# Patient Record
Sex: OTHER/UNKNOWN
[De-identification: names, ages, dates, MRNs, and addresses within clinical notes are randomized per-mention and may not be internally consistent; named-entity substitution may affect disease eponyms.]

---

## 2024-07-24 ENCOUNTER — APPOINTMENT (OUTPATIENT)
Dept: PLASTIC SURGERY | Facility: CLINIC | Age: 19
End: 2024-07-24
Payer: MEDICAID

## 2024-07-24 VITALS
DIASTOLIC BLOOD PRESSURE: 88 MMHG | WEIGHT: 130 LBS | OXYGEN SATURATION: 94 % | BODY MASS INDEX: 19.26 KG/M2 | HEIGHT: 69 IN | SYSTOLIC BLOOD PRESSURE: 127 MMHG | HEART RATE: 107 BPM

## 2024-07-24 DIAGNOSIS — F64.9 GENDER IDENTITY DISORDER, UNSPECIFIED: ICD-10-CM

## 2024-07-24 DIAGNOSIS — Z87.19 PERSONAL HISTORY OF OTHER DISEASES OF THE DIGESTIVE SYSTEM: ICD-10-CM

## 2024-07-24 PROBLEM — Z00.00 ENCOUNTER FOR PREVENTIVE HEALTH EXAMINATION: Status: ACTIVE | Noted: 2024-07-24

## 2024-07-24 PROCEDURE — 99203 OFFICE O/P NEW LOW 30 MIN: CPT

## 2024-07-24 RX ORDER — ESTRADIOL VALERATE 40 MG/ML
INJECTION INTRAMUSCULAR
Refills: 0 | Status: ACTIVE | COMMUNITY

## 2024-07-24 RX ORDER — OMEPRAZOLE 40 MG/1
40 CAPSULE, DELAYED RELEASE ORAL
Refills: 0 | Status: ACTIVE | COMMUNITY

## 2024-07-24 RX ORDER — SPIRONOLACTONE 100 MG/1
100 TABLET ORAL
Refills: 0 | Status: ACTIVE | COMMUNITY

## 2024-07-25 NOTE — DISCUSSION/SUMMARY
[FreeTextEntry1] : This evon patient began transitioning since December 2023. She has been on hormonal therapy consistently since December 2023. She has been in therapy and was diagnosed with Gender Dysphoria concerned about certain facial features that appear masculine and cause bullying desires facial feminization surgery followed by Transgender team. The following facial features appear masculine and will need to be modified: -Brow -Nose -Jawline -Neck -cheeks -Lips     Allergies: - NKDA   Meds: - Estradiol injection - Omeprazole   Surgical History Denies past surgical history   Denies previous botox, fillers or silicone injections.  SH: Denies marijuana use, Denies Cigarette use   ROS: General health / Constitutional: no fever, no chills, no unusual weight changes, no energy level changes, no night sweats Skin: No color or pigmentation changes, no pruritis, no rashes, no ulcers, Hair: No changes in color, texture, distribution, loss Nails: No color changes, brittleness, infection Head: No headaches, no new jaw pain Eyes: Good visual acuity, no color blindness, no corrective lenses, no photophobia, no diplopia, no blurred vision, no infection, pain, no medications, Ear: no tinnitus, no discharge, no pain, no medications Nose: no epistaxis, no rhinorrhea, no rhinitis, no pain, Mouth & Throat: no gingivitis, no gingival bleeding, no ulcers, no voice changes, no changes in oral mucosa or tongue Neck no stiffness, no pain, no lymphadenitis, no thyroid disorders, Respiratory: no cough, no dyspnea, no wheezing, no chest pain, cyanosis, no pneumonia, no asthma, Cardiovascular: no chest pain, no palpitations, no irregular rhythm, no tachycardia, no bradycardia, no heart failure, no dyspnea on exertion (IBARRA), no edema Gastrointestinal: no nausea / vomiting, no dysphagia, no reflux / GERD, no abdominal pain, no jaundice Musculoskeletal: no pain in muscles, bones, or joints; no fractures, no dislocations, no muscular weakness, no atrophy Neurological: no paresis, no paralysis, no paresthesia, no seizures, no dizziness, no syncope, no ataxia, no tremor Psychological: no childhood behavioral problems, no irritability, no sleep changes Hematological: no anemia, no bruising, no bleeding tendencies,   PHYSICAL EXAM General: WDWN, in no distress, A & O x 3 (person, place, time) HEENT Head: AT/NC (atraumatic, normocephalic), including TMJ, sensory and motor; + Prominent brow and lateral orbital rim type III Eyes: EOMI, PERRLA Ears: exterior, nl hearing, Nose: + slightly wide, bulbous nasal tip with acute nasolabial angle intranasal exam showed enlarged turbinates and deviated caudal septum Throat & mouth: gd palate elevation, nl tongue mobility, nl tonsil size; + Prominent mandibular angle with active masseter, boxy wide chin, Hypoplastic cheeks, Hypoplastic upper and lower lips. Neck: no masses, nl pulses, no prominent tracheal bulge ("Jakob's Apple"), +excess submental fat   We had a 25 minute meeting with the patient discussing diagnosis and medical management issues and outcomes.   First stage FFS: 21139: Frontal sinus anterior wall setback 68576: Orbital reconstruction bilateral 64173: Hairline lowering 13925: Browlift bilateral 68094: Supraorbital bar recontouring bilateral 48262: Tarsorrhaphy bilateral 89121: Mandibular angle contouring 63557-63: Mandibular angle osteotomy 62127: Mandibular reconstruction with autologous tissue bilateral 49162: Osseous genioplasty narrowing, shortening 68167: Mandibular reconstruction with prosthetics bilateral 44005: Rhinoplasty open 35185: Submucous resection of septum 99020: Cartilage grafting for nasal reconstruction, use of cadaveric cartilage for  grafts, columellar strut, tip graft 78736: Submental fat excision 93042: platysmaplasty 40799 x 2: Upper and lower lip augmentation 14303-73: Bilateral cheek implants       21139 (Frontal sinus setback): Previous exposure to testosterone has led this patient to have a male appearing forehead with a more bossed shaped; this is opposed to a female appearing forehead that is more flat. A frontal sinus setback procedure will reshape the anterior wall of the frontal sinus by pushing back the bone and change the contour from convex (male-shape) to flat (female-shape). This surgical change will create a more flattened feminine brow appearance.          55012 (hairline lowering) and 76821 (Browlift): Previous exposure to testosterone has led to the patient having a male M-shaped hairline as opposed to a female upside-down U-shaped hairline. Her receded hairline also creates a high, broad male appearing forehead. Her low set eyebrows on top of her orbital roof rim give her a emmanuel, male-appearing look. Both of these male traits: a high hairline and low-set brows are causing her intense feelings of dysphoria. She would benefit from bilateral browlift and hairline lowering procedures. Raising her lateral eyebrow with a bilateral browlift will create a more female appearance. She has stressed a strong desire to wear her own natural hair and does not want to always have to wear a wig to cover up her male features.          21256 (bilateral orbital reconstruction): The bone growth that occurred during testosterone exposure in the upper half of each orbital has caused this patient to have male appearing orbital features that contribute to the sense of dysphoria she feels in public. Orbital reconstruction with a reciprocating saw for an L-shaped ostectomy, on both sides will help remove the excessive bony protrusion; this will be followed by bone material grafting and resorbable plate fixation. The bilateral orbital reconstruction will help alleviate these orbital and upper facial male features.          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 Supraorbital contouring of this lateral orbital region with a pineapple abiel will correct this male feature that is causing this patient dysphoria. These procedures also allows us to do a success browlift procedure since the overhang of bone can inhibit the upper movement of the brow and the removal of this allows for an effective lift.          47387 (Tarsorrhaphy): Bilateral tarsorrhaphy or surgical closure of both eyelids, after protective lacrilube or perilube ointment is applied, is necessary for the safety of the patients globes. With the brow reshaping and browlift procedure the upper eyelid will be pulled up so the globe will be exposed and unprotected during this long, proposed procedure. The tarsorrhaphies, allows both globes to be protected so the complications of corneal abrasions may be prevented.          18652-25 (mandibular angle osteotomy) and 21209 (mandibular angle contouring): This patient has an angular, more boxy jaw which results in male appearing lower face. She also has increased lower facial width related to her mandibular angle lateral projection. Mandibular angle resection and contouring will create a more feminine triangular jaw. By having a mandibular angle reduction through both of these procedures, the lower facial width is narrowed and this will create a V-shaped, feminine appearance of the jawline when viewed from the front.           56674 (Reconstruction of lower jaw with autologous tissue): This patients wide, U-shaped lower jaw accounts for public criticism related to male facial features. We propose reconstruction of the lower jaw with bone graft material layered to form a more feminine shape. The goal of this autologous tissue reconstructive procedure is to achieve a tapered, feminine lower jaw.          57570 (osseous genioplasty): This patient has a wide, large chin that contributes to her feelings of gender dysphoria and being mis-gendered in public.  The patient would benefit from an osseous genioplasty that narrows and shortens the chin. The osseous genioplasty will help create a more feminine and more, slender chin.          79572 (Reconstruction of lower jaw with prosthetic): This patient complained of a male shape to the lower one-third of her face. Reconstruction with a titanium implant used to create a chin point angle and support the bone in healing will help the patient achieve her goal of a more female lower face.          88293 (Rhinoplasty): This patients nose has characteristics of a male nose. The male nose is often larger and wider with a more bulbous nasal tip. These male nasal features make her feel masculine which exacerbates her gender dysphoria. A rhinoplasty would be beneficial to feminize her nose by creating a smaller nose and more delicate, softer nasal tip. The lateral dorsal shape will also be feminized by the rhinoplasty.          01969 (Submucous resection of nose): This patients nasal septum is shaped like a male septum. The septum is the supportive pole that holds up the structure of the nasal pyramid. In this case the septum will also be used to provide cartilage tissue necessary for nasal grafts. This septoplasty is required to modify the septum to create a straight nose with good functional breathing while taking away the characteristics of a male nose.          45463 (Cartilage grafting for nasal reconstruction): Cartilage grafting is crucial for the performance of the feminizing rhinoplasty. With regards to her nose, she wants to keep true to her ethnicity while appearing more feminine. To do that cartilage grafts including, bilateral  grafts, a columellar strut graft, a dorsal onlay graft, and nasal tip graft are all necessary.  The dorsal onlay graft will raise the nasal radix and improve the frontonasal regional shape.          02545 (Submental fat removal): Testosterone exposure will build fibrofatty tissue in the submental region that is not corrected by hormone therapy. This patient has this fibrofatty tissue in the submental region which has created a masculine lateral profile appearance. Direct submental fibrofatty tissue excision is necessary to correct this male feature.          07049 (Platysmaplasty): With prolonged testosterone exposure, the submental region will appear full and ptotic. This patient has a full and ptotic submental and neck region which is associated with a male-appearing neck. The female neck is slender and tight. The platsymaplasty, performed after submental fat excision, will help create a slender and tight, female appearing neck.          40799 x 2 (Upper and lower lip augmentation): Females are shown to have nevarez lips than males, therefore, an upper lip augmentation will create a more feminine appearance for this patient as she has currently has hypoplastic lips. Lip augmentation is also a procedure that not all patients need but we deem that this procedure is necessary for this patient as it well help alleviate her gender dysphoria.        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 She would benefit from implants lateral to the nose on both sides to establish a nevarez appearance that softens the face so it will look less harsh.  Needs a 3D CT scan and Virtual Surgical Planning. She will need to provide a letter from her therapist and hormone provider. Will need PCP clearance.   Patient seen in conjunction with Dr. Moshe Menendez.

## 2024-07-25 NOTE — DISCUSSION/SUMMARY
[FreeTextEntry1] : This evon patient began transitioning since December 2023. She has been on hormonal therapy consistently since December 2023. She has been in therapy and was diagnosed with Gender Dysphoria concerned about certain facial features that appear masculine and cause bullying desires facial feminization surgery followed by Transgender team. The following facial features appear masculine and will need to be modified: -Brow -Nose -Jawline -Neck -cheeks -Lips     Allergies: - NKDA   Meds: - Estradiol injection - Omeprazole   Surgical History Denies past surgical history   Denies previous botox, fillers or silicone injections.  SH: Denies marijuana use, Denies Cigarette use   ROS: General health / Constitutional: no fever, no chills, no unusual weight changes, no energy level changes, no night sweats Skin: No color or pigmentation changes, no pruritis, no rashes, no ulcers, Hair: No changes in color, texture, distribution, loss Nails: No color changes, brittleness, infection Head: No headaches, no new jaw pain Eyes: Good visual acuity, no color blindness, no corrective lenses, no photophobia, no diplopia, no blurred vision, no infection, pain, no medications, Ear: no tinnitus, no discharge, no pain, no medications Nose: no epistaxis, no rhinorrhea, no rhinitis, no pain, Mouth & Throat: no gingivitis, no gingival bleeding, no ulcers, no voice changes, no changes in oral mucosa or tongue Neck no stiffness, no pain, no lymphadenitis, no thyroid disorders, Respiratory: no cough, no dyspnea, no wheezing, no chest pain, cyanosis, no pneumonia, no asthma, Cardiovascular: no chest pain, no palpitations, no irregular rhythm, no tachycardia, no bradycardia, no heart failure, no dyspnea on exertion (IBARRA), no edema Gastrointestinal: no nausea / vomiting, no dysphagia, no reflux / GERD, no abdominal pain, no jaundice Musculoskeletal: no pain in muscles, bones, or joints; no fractures, no dislocations, no muscular weakness, no atrophy Neurological: no paresis, no paralysis, no paresthesia, no seizures, no dizziness, no syncope, no ataxia, no tremor Psychological: no childhood behavioral problems, no irritability, no sleep changes Hematological: no anemia, no bruising, no bleeding tendencies,   PHYSICAL EXAM General: WDWN, in no distress, A & O x 3 (person, place, time) HEENT Head: AT/NC (atraumatic, normocephalic), including TMJ, sensory and motor; + Prominent brow and lateral orbital rim type III Eyes: EOMI, PERRLA Ears: exterior, nl hearing, Nose: + slightly wide, bulbous nasal tip with acute nasolabial angle intranasal exam showed enlarged turbinates and deviated caudal septum Throat & mouth: gd palate elevation, nl tongue mobility, nl tonsil size; + Prominent mandibular angle with active masseter, boxy wide chin, Hypoplastic cheeks, Hypoplastic upper and lower lips. Neck: no masses, nl pulses, no prominent tracheal bulge ("Jakob's Apple"), +excess submental fat   We had a 25 minute meeting with the patient discussing diagnosis and medical management issues and outcomes.   First stage FFS: 21139: Frontal sinus anterior wall setback 80968: Orbital reconstruction bilateral 12883: Hairline lowering 19493: Browlift bilateral 70175: Supraorbital bar recontouring bilateral 94901: Tarsorrhaphy bilateral 67429: Mandibular angle contouring 83704-90: Mandibular angle osteotomy 17040: Mandibular reconstruction with autologous tissue bilateral 80625: Osseous genioplasty narrowing, shortening 83908: Mandibular reconstruction with prosthetics bilateral 02013: Rhinoplasty open 85361: Submucous resection of septum 78877: Cartilage grafting for nasal reconstruction, use of cadaveric cartilage for  grafts, columellar strut, tip graft 84956: Submental fat excision 28007: platysmaplasty 40799 x 2: Upper and lower lip augmentation 14720-16: Bilateral cheek implants       21139 (Frontal sinus setback): Previous exposure to testosterone has led this patient to have a male appearing forehead with a more bossed shaped; this is opposed to a female appearing forehead that is more flat. A frontal sinus setback procedure will reshape the anterior wall of the frontal sinus by pushing back the bone and change the contour from convex (male-shape) to flat (female-shape). This surgical change will create a more flattened feminine brow appearance.          00601 (hairline lowering) and 92749 (Browlift): Previous exposure to testosterone has led to the patient having a male M-shaped hairline as opposed to a female upside-down U-shaped hairline. Her receded hairline also creates a high, broad male appearing forehead. Her low set eyebrows on top of her orbital roof rim give her a emmanuel, male-appearing look. Both of these male traits: a high hairline and low-set brows are causing her intense feelings of dysphoria. She would benefit from bilateral browlift and hairline lowering procedures. Raising her lateral eyebrow with a bilateral browlift will create a more female appearance. She has stressed a strong desire to wear her own natural hair and does not want to always have to wear a wig to cover up her male features.          21256 (bilateral orbital reconstruction): The bone growth that occurred during testosterone exposure in the upper half of each orbital has caused this patient to have male appearing orbital features that contribute to the sense of dysphoria she feels in public. Orbital reconstruction with a reciprocating saw for an L-shaped ostectomy, on both sides will help remove the excessive bony protrusion; this will be followed by bone material grafting and resorbable plate fixation. The bilateral orbital reconstruction will help alleviate these orbital and upper facial male features.          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 Supraorbital contouring of this lateral orbital region with a pineapple abiel will correct this male feature that is causing this patient dysphoria. These procedures also allows us to do a success browlift procedure since the overhang of bone can inhibit the upper movement of the brow and the removal of this allows for an effective lift.          74632 (Tarsorrhaphy): Bilateral tarsorrhaphy or surgical closure of both eyelids, after protective lacrilube or perilube ointment is applied, is necessary for the safety of the patients globes. With the brow reshaping and browlift procedure the upper eyelid will be pulled up so the globe will be exposed and unprotected during this long, proposed procedure. The tarsorrhaphies, allows both globes to be protected so the complications of corneal abrasions may be prevented.          14302-29 (mandibular angle osteotomy) and 21209 (mandibular angle contouring): This patient has an angular, more boxy jaw which results in male appearing lower face. She also has increased lower facial width related to her mandibular angle lateral projection. Mandibular angle resection and contouring will create a more feminine triangular jaw. By having a mandibular angle reduction through both of these procedures, the lower facial width is narrowed and this will create a V-shaped, feminine appearance of the jawline when viewed from the front.           31127 (Reconstruction of lower jaw with autologous tissue): This patients wide, U-shaped lower jaw accounts for public criticism related to male facial features. We propose reconstruction of the lower jaw with bone graft material layered to form a more feminine shape. The goal of this autologous tissue reconstructive procedure is to achieve a tapered, feminine lower jaw.          59860 (osseous genioplasty): This patient has a wide, large chin that contributes to her feelings of gender dysphoria and being mis-gendered in public.  The patient would benefit from an osseous genioplasty that narrows and shortens the chin. The osseous genioplasty will help create a more feminine and more, slender chin.          93299 (Reconstruction of lower jaw with prosthetic): This patient complained of a male shape to the lower one-third of her face. Reconstruction with a titanium implant used to create a chin point angle and support the bone in healing will help the patient achieve her goal of a more female lower face.          59106 (Rhinoplasty): This patients nose has characteristics of a male nose. The male nose is often larger and wider with a more bulbous nasal tip. These male nasal features make her feel masculine which exacerbates her gender dysphoria. A rhinoplasty would be beneficial to feminize her nose by creating a smaller nose and more delicate, softer nasal tip. The lateral dorsal shape will also be feminized by the rhinoplasty.          20026 (Submucous resection of nose): This patients nasal septum is shaped like a male septum. The septum is the supportive pole that holds up the structure of the nasal pyramid. In this case the septum will also be used to provide cartilage tissue necessary for nasal grafts. This septoplasty is required to modify the septum to create a straight nose with good functional breathing while taking away the characteristics of a male nose.          65718 (Cartilage grafting for nasal reconstruction): Cartilage grafting is crucial for the performance of the feminizing rhinoplasty. With regards to her nose, she wants to keep true to her ethnicity while appearing more feminine. To do that cartilage grafts including, bilateral  grafts, a columellar strut graft, a dorsal onlay graft, and nasal tip graft are all necessary.  The dorsal onlay graft will raise the nasal radix and improve the frontonasal regional shape.          98628 (Submental fat removal): Testosterone exposure will build fibrofatty tissue in the submental region that is not corrected by hormone therapy. This patient has this fibrofatty tissue in the submental region which has created a masculine lateral profile appearance. Direct submental fibrofatty tissue excision is necessary to correct this male feature.          60176 (Platysmaplasty): With prolonged testosterone exposure, the submental region will appear full and ptotic. This patient has a full and ptotic submental and neck region which is associated with a male-appearing neck. The female neck is slender and tight. The platsymaplasty, performed after submental fat excision, will help create a slender and tight, female appearing neck.          40799 x 2 (Upper and lower lip augmentation): Females are shown to have nevarez lips than males, therefore, an upper lip augmentation will create a more feminine appearance for this patient as she has currently has hypoplastic lips. Lip augmentation is also a procedure that not all patients need but we deem that this procedure is necessary for this patient as it well help alleviate her gender dysphoria.        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 She would benefit from implants lateral to the nose on both sides to establish a nevarez appearance that softens the face so it will look less harsh.  Needs a 3D CT scan and Virtual Surgical Planning. She will need to provide a letter from her therapist and hormone provider. Will need PCP clearance.   Patient seen in conjunction with Dr. Moshe Menendez.

## 2024-07-25 NOTE — DISCUSSION/SUMMARY
[FreeTextEntry1] : This evon patient began transitioning since December 2023. She has been on hormonal therapy consistently since December 2023. She has been in therapy and was diagnosed with Gender Dysphoria concerned about certain facial features that appear masculine and cause bullying desires facial feminization surgery followed by Transgender team. The following facial features appear masculine and will need to be modified: -Brow -Nose -Jawline -Neck -cheeks -Lips     Allergies: - NKDA   Meds: - Estradiol injection - Omeprazole   Surgical History Denies past surgical history   Denies previous botox, fillers or silicone injections.  SH: Denies marijuana use, Denies Cigarette use   ROS: General health / Constitutional: no fever, no chills, no unusual weight changes, no energy level changes, no night sweats Skin: No color or pigmentation changes, no pruritis, no rashes, no ulcers, Hair: No changes in color, texture, distribution, loss Nails: No color changes, brittleness, infection Head: No headaches, no new jaw pain Eyes: Good visual acuity, no color blindness, no corrective lenses, no photophobia, no diplopia, no blurred vision, no infection, pain, no medications, Ear: no tinnitus, no discharge, no pain, no medications Nose: no epistaxis, no rhinorrhea, no rhinitis, no pain, Mouth & Throat: no gingivitis, no gingival bleeding, no ulcers, no voice changes, no changes in oral mucosa or tongue Neck no stiffness, no pain, no lymphadenitis, no thyroid disorders, Respiratory: no cough, no dyspnea, no wheezing, no chest pain, cyanosis, no pneumonia, no asthma, Cardiovascular: no chest pain, no palpitations, no irregular rhythm, no tachycardia, no bradycardia, no heart failure, no dyspnea on exertion (IBARRA), no edema Gastrointestinal: no nausea / vomiting, no dysphagia, no reflux / GERD, no abdominal pain, no jaundice Musculoskeletal: no pain in muscles, bones, or joints; no fractures, no dislocations, no muscular weakness, no atrophy Neurological: no paresis, no paralysis, no paresthesia, no seizures, no dizziness, no syncope, no ataxia, no tremor Psychological: no childhood behavioral problems, no irritability, no sleep changes Hematological: no anemia, no bruising, no bleeding tendencies,   PHYSICAL EXAM General: WDWN, in no distress, A & O x 3 (person, place, time) HEENT Head: AT/NC (atraumatic, normocephalic), including TMJ, sensory and motor; + Prominent brow and lateral orbital rim type III Eyes: EOMI, PERRLA Ears: exterior, nl hearing, Nose: + slightly wide, bulbous nasal tip with acute nasolabial angle intranasal exam showed enlarged turbinates and deviated caudal septum Throat & mouth: gd palate elevation, nl tongue mobility, nl tonsil size; + Prominent mandibular angle with active masseter, boxy wide chin, Hypoplastic cheeks, Hypoplastic upper and lower lips. Neck: no masses, nl pulses, no prominent tracheal bulge ("Jakob's Apple"), +excess submental fat   We had a 25 minute meeting with the patient discussing diagnosis and medical management issues and outcomes.   First stage FFS: 21139: Frontal sinus anterior wall setback 41919: Orbital reconstruction bilateral 26681: Hairline lowering 98736: Browlift bilateral 73065: Supraorbital bar recontouring bilateral 79533: Tarsorrhaphy bilateral 17871: Mandibular angle contouring 29855-55: Mandibular angle osteotomy 09643: Mandibular reconstruction with autologous tissue bilateral 33008: Osseous genioplasty narrowing, shortening 67715: Mandibular reconstruction with prosthetics bilateral 53260: Rhinoplasty open 46079: Submucous resection of septum 74799: Cartilage grafting for nasal reconstruction, use of cadaveric cartilage for  grafts, columellar strut, tip graft 38316: Submental fat excision 25992: platysmaplasty 40799 x 2: Upper and lower lip augmentation 54541-69: Bilateral cheek implants       21139 (Frontal sinus setback): Previous exposure to testosterone has led this patient to have a male appearing forehead with a more bossed shaped; this is opposed to a female appearing forehead that is more flat. A frontal sinus setback procedure will reshape the anterior wall of the frontal sinus by pushing back the bone and change the contour from convex (male-shape) to flat (female-shape). This surgical change will create a more flattened feminine brow appearance.          84646 (hairline lowering) and 43294 (Browlift): Previous exposure to testosterone has led to the patient having a male M-shaped hairline as opposed to a female upside-down U-shaped hairline. Her receded hairline also creates a high, broad male appearing forehead. Her low set eyebrows on top of her orbital roof rim give her a emmanuel, male-appearing look. Both of these male traits: a high hairline and low-set brows are causing her intense feelings of dysphoria. She would benefit from bilateral browlift and hairline lowering procedures. Raising her lateral eyebrow with a bilateral browlift will create a more female appearance. She has stressed a strong desire to wear her own natural hair and does not want to always have to wear a wig to cover up her male features.          21256 (bilateral orbital reconstruction): The bone growth that occurred during testosterone exposure in the upper half of each orbital has caused this patient to have male appearing orbital features that contribute to the sense of dysphoria she feels in public. Orbital reconstruction with a reciprocating saw for an L-shaped ostectomy, on both sides will help remove the excessive bony protrusion; this will be followed by bone material grafting and resorbable plate fixation. The bilateral orbital reconstruction will help alleviate these orbital and upper facial male features.          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 Supraorbital contouring of this lateral orbital region with a pineapple abiel will correct this male feature that is causing this patient dysphoria. These procedures also allows us to do a success browlift procedure since the overhang of bone can inhibit the upper movement of the brow and the removal of this allows for an effective lift.          70994 (Tarsorrhaphy): Bilateral tarsorrhaphy or surgical closure of both eyelids, after protective lacrilube or perilube ointment is applied, is necessary for the safety of the patients globes. With the brow reshaping and browlift procedure the upper eyelid will be pulled up so the globe will be exposed and unprotected during this long, proposed procedure. The tarsorrhaphies, allows both globes to be protected so the complications of corneal abrasions may be prevented.          09690-76 (mandibular angle osteotomy) and 21209 (mandibular angle contouring): This patient has an angular, more boxy jaw which results in male appearing lower face. She also has increased lower facial width related to her mandibular angle lateral projection. Mandibular angle resection and contouring will create a more feminine triangular jaw. By having a mandibular angle reduction through both of these procedures, the lower facial width is narrowed and this will create a V-shaped, feminine appearance of the jawline when viewed from the front.           67879 (Reconstruction of lower jaw with autologous tissue): This patients wide, U-shaped lower jaw accounts for public criticism related to male facial features. We propose reconstruction of the lower jaw with bone graft material layered to form a more feminine shape. The goal of this autologous tissue reconstructive procedure is to achieve a tapered, feminine lower jaw.          70969 (osseous genioplasty): This patient has a wide, large chin that contributes to her feelings of gender dysphoria and being mis-gendered in public.  The patient would benefit from an osseous genioplasty that narrows and shortens the chin. The osseous genioplasty will help create a more feminine and more, slender chin.          40032 (Reconstruction of lower jaw with prosthetic): This patient complained of a male shape to the lower one-third of her face. Reconstruction with a titanium implant used to create a chin point angle and support the bone in healing will help the patient achieve her goal of a more female lower face.          91534 (Rhinoplasty): This patients nose has characteristics of a male nose. The male nose is often larger and wider with a more bulbous nasal tip. These male nasal features make her feel masculine which exacerbates her gender dysphoria. A rhinoplasty would be beneficial to feminize her nose by creating a smaller nose and more delicate, softer nasal tip. The lateral dorsal shape will also be feminized by the rhinoplasty.          75554 (Submucous resection of nose): This patients nasal septum is shaped like a male septum. The septum is the supportive pole that holds up the structure of the nasal pyramid. In this case the septum will also be used to provide cartilage tissue necessary for nasal grafts. This septoplasty is required to modify the septum to create a straight nose with good functional breathing while taking away the characteristics of a male nose.          03085 (Cartilage grafting for nasal reconstruction): Cartilage grafting is crucial for the performance of the feminizing rhinoplasty. With regards to her nose, she wants to keep true to her ethnicity while appearing more feminine. To do that cartilage grafts including, bilateral  grafts, a columellar strut graft, a dorsal onlay graft, and nasal tip graft are all necessary.  The dorsal onlay graft will raise the nasal radix and improve the frontonasal regional shape.          68567 (Submental fat removal): Testosterone exposure will build fibrofatty tissue in the submental region that is not corrected by hormone therapy. This patient has this fibrofatty tissue in the submental region which has created a masculine lateral profile appearance. Direct submental fibrofatty tissue excision is necessary to correct this male feature.          64521 (Platysmaplasty): With prolonged testosterone exposure, the submental region will appear full and ptotic. This patient has a full and ptotic submental and neck region which is associated with a male-appearing neck. The female neck is slender and tight. The platsymaplasty, performed after submental fat excision, will help create a slender and tight, female appearing neck.          40799 x 2 (Upper and lower lip augmentation): Females are shown to have nevarez lips than males, therefore, an upper lip augmentation will create a more feminine appearance for this patient as she has currently has hypoplastic lips. Lip augmentation is also a procedure that not all patients need but we deem that this procedure is necessary for this patient as it well help alleviate her gender dysphoria.        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 She would benefit from implants lateral to the nose on both sides to establish a nevarez appearance that softens the face so it will look less harsh.  Needs a 3D CT scan and Virtual Surgical Planning. She will need to provide a letter from her therapist and hormone provider. Will need PCP clearance.   Patient seen in conjunction with Dr. Moshe Menendez.

## 2024-08-02 ENCOUNTER — OFFICE (OUTPATIENT)
Dept: URBAN - METROPOLITAN AREA CLINIC 76 | Facility: CLINIC | Age: 19
Setting detail: OPHTHALMOLOGY
End: 2024-08-02
Payer: COMMERCIAL

## 2024-08-02 DIAGNOSIS — H52.13: ICD-10-CM

## 2024-08-02 PROCEDURE — 92015 DETERMINE REFRACTIVE STATE: CPT | Performed by: OPTOMETRIST

## 2024-08-02 PROCEDURE — 92004 COMPRE OPH EXAM NEW PT 1/>: CPT | Performed by: OPTOMETRIST

## 2024-08-02 ASSESSMENT — CONFRONTATIONAL VISUAL FIELD TEST (CVF)
OD_FINDINGS: FULL
OS_FINDINGS: FULL

## 2025-05-29 ENCOUNTER — NON-APPOINTMENT (OUTPATIENT)
Age: 20
End: 2025-05-29

## 2025-05-30 ENCOUNTER — APPOINTMENT (OUTPATIENT)
Dept: PLASTIC SURGERY | Facility: CLINIC | Age: 20
End: 2025-05-30
Payer: MEDICAID

## 2025-05-30 VITALS
BODY MASS INDEX: 19.11 KG/M2 | WEIGHT: 129 LBS | HEIGHT: 69 IN | TEMPERATURE: 97.7 F | OXYGEN SATURATION: 98 % | DIASTOLIC BLOOD PRESSURE: 83 MMHG | SYSTOLIC BLOOD PRESSURE: 116 MMHG | HEART RATE: 107 BPM

## 2025-05-30 DIAGNOSIS — F64.9 GENDER IDENTITY DISORDER, UNSPECIFIED: ICD-10-CM

## 2025-05-30 PROCEDURE — 99215 OFFICE O/P EST HI 40 MIN: CPT

## 2025-06-13 ENCOUNTER — APPOINTMENT (OUTPATIENT)
Dept: CT IMAGING | Facility: CLINIC | Age: 20
End: 2025-06-13